# Patient Record
Sex: MALE | Race: OTHER | NOT HISPANIC OR LATINO
[De-identification: names, ages, dates, MRNs, and addresses within clinical notes are randomized per-mention and may not be internally consistent; named-entity substitution may affect disease eponyms.]

---
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
## 2022-02-01 ENCOUNTER — NON-APPOINTMENT (OUTPATIENT)
Age: 22
End: 2022-02-01

## 2022-02-01 ENCOUNTER — EMERGENCY (EMERGENCY)
Facility: HOSPITAL | Age: 22
LOS: 1 days | Discharge: HOME | End: 2022-02-01
Attending: EMERGENCY MEDICINE
Payer: COMMERCIAL

## 2022-02-01 VITALS
SYSTOLIC BLOOD PRESSURE: 137 MMHG | HEIGHT: 70 IN | WEIGHT: 195.11 LBS | TEMPERATURE: 99 F | RESPIRATION RATE: 20 BRPM | HEART RATE: 105 BPM | OXYGEN SATURATION: 99 % | DIASTOLIC BLOOD PRESSURE: 85 MMHG

## 2022-02-01 VITALS
DIASTOLIC BLOOD PRESSURE: 93 MMHG | TEMPERATURE: 99 F | HEART RATE: 92 BPM | SYSTOLIC BLOOD PRESSURE: 131 MMHG | RESPIRATION RATE: 18 BRPM | OXYGEN SATURATION: 97 %

## 2022-02-01 LAB
ALBUMIN SERPL ELPH-MCNC: 5.2 G/DL — HIGH (ref 3.3–5)
ALP SERPL-CCNC: 119 U/L — SIGNIFICANT CHANGE UP (ref 40–120)
ALT FLD-CCNC: 90 U/L — HIGH (ref 10–45)
ANION GAP SERPL CALC-SCNC: 15 MMOL/L — SIGNIFICANT CHANGE UP (ref 5–17)
APPEARANCE UR: CLEAR — SIGNIFICANT CHANGE UP
AST SERPL-CCNC: 49 U/L — HIGH (ref 10–40)
BACTERIA # UR AUTO: NEGATIVE — SIGNIFICANT CHANGE UP
BASOPHILS # BLD AUTO: 0.04 K/UL — SIGNIFICANT CHANGE UP (ref 0–0.2)
BASOPHILS NFR BLD AUTO: 0.4 % — SIGNIFICANT CHANGE UP (ref 0–2)
BILIRUB SERPL-MCNC: 0.7 MG/DL — SIGNIFICANT CHANGE UP (ref 0.2–1.2)
BILIRUB UR-MCNC: NEGATIVE — SIGNIFICANT CHANGE UP
BUN SERPL-MCNC: 14 MG/DL — SIGNIFICANT CHANGE UP (ref 7–23)
CALCIUM SERPL-MCNC: 10.1 MG/DL — SIGNIFICANT CHANGE UP (ref 8.4–10.5)
CHLORIDE SERPL-SCNC: 101 MMOL/L — SIGNIFICANT CHANGE UP (ref 96–108)
CK SERPL-CCNC: 120 U/L — SIGNIFICANT CHANGE UP (ref 30–200)
CO2 SERPL-SCNC: 23 MMOL/L — SIGNIFICANT CHANGE UP (ref 22–31)
COLOR SPEC: YELLOW — SIGNIFICANT CHANGE UP
CREAT SERPL-MCNC: 1.08 MG/DL — SIGNIFICANT CHANGE UP (ref 0.5–1.3)
DIFF PNL FLD: NEGATIVE — SIGNIFICANT CHANGE UP
EOSINOPHIL # BLD AUTO: 0.12 K/UL — SIGNIFICANT CHANGE UP (ref 0–0.5)
EOSINOPHIL NFR BLD AUTO: 1.1 % — SIGNIFICANT CHANGE UP (ref 0–6)
EPI CELLS # UR: 0 /HPF — SIGNIFICANT CHANGE UP
GLUCOSE SERPL-MCNC: 96 MG/DL — SIGNIFICANT CHANGE UP (ref 70–99)
GLUCOSE UR QL: NEGATIVE — SIGNIFICANT CHANGE UP
HCT VFR BLD CALC: 51.1 % — HIGH (ref 39–50)
HGB BLD-MCNC: 17.4 G/DL — HIGH (ref 13–17)
IMM GRANULOCYTES NFR BLD AUTO: 0.2 % — SIGNIFICANT CHANGE UP (ref 0–1.5)
KETONES UR-MCNC: NEGATIVE — SIGNIFICANT CHANGE UP
LEUKOCYTE ESTERASE UR-ACNC: NEGATIVE — SIGNIFICANT CHANGE UP
LYMPHOCYTES # BLD AUTO: 3.41 K/UL — HIGH (ref 1–3.3)
LYMPHOCYTES # BLD AUTO: 31.8 % — SIGNIFICANT CHANGE UP (ref 13–44)
MCHC RBC-ENTMCNC: 28 PG — SIGNIFICANT CHANGE UP (ref 27–34)
MCHC RBC-ENTMCNC: 34.1 GM/DL — SIGNIFICANT CHANGE UP (ref 32–36)
MCV RBC AUTO: 82.2 FL — SIGNIFICANT CHANGE UP (ref 80–100)
MONOCYTES # BLD AUTO: 0.68 K/UL — SIGNIFICANT CHANGE UP (ref 0–0.9)
MONOCYTES NFR BLD AUTO: 6.3 % — SIGNIFICANT CHANGE UP (ref 2–14)
NEUTROPHILS # BLD AUTO: 6.47 K/UL — SIGNIFICANT CHANGE UP (ref 1.8–7.4)
NEUTROPHILS NFR BLD AUTO: 60.2 % — SIGNIFICANT CHANGE UP (ref 43–77)
NITRITE UR-MCNC: NEGATIVE — SIGNIFICANT CHANGE UP
NRBC # BLD: 0 /100 WBCS — SIGNIFICANT CHANGE UP (ref 0–0)
PH UR: 6 — SIGNIFICANT CHANGE UP (ref 5–8)
PLATELET # BLD AUTO: 264 K/UL — SIGNIFICANT CHANGE UP (ref 150–400)
POTASSIUM SERPL-MCNC: 3.9 MMOL/L — SIGNIFICANT CHANGE UP (ref 3.5–5.3)
POTASSIUM SERPL-SCNC: 3.9 MMOL/L — SIGNIFICANT CHANGE UP (ref 3.5–5.3)
PROT SERPL-MCNC: 7.7 G/DL — SIGNIFICANT CHANGE UP (ref 6–8.3)
PROT UR-MCNC: ABNORMAL
RBC # BLD: 6.22 M/UL — HIGH (ref 4.2–5.8)
RBC # FLD: 12.4 % — SIGNIFICANT CHANGE UP (ref 10.3–14.5)
RBC CASTS # UR COMP ASSIST: 0 /HPF — SIGNIFICANT CHANGE UP (ref 0–4)
SODIUM SERPL-SCNC: 139 MMOL/L — SIGNIFICANT CHANGE UP (ref 135–145)
SP GR SPEC: 1.03 — HIGH (ref 1.01–1.02)
UROBILINOGEN FLD QL: NEGATIVE — SIGNIFICANT CHANGE UP
WBC # BLD: 10.74 K/UL — HIGH (ref 3.8–10.5)
WBC # FLD AUTO: 10.74 K/UL — HIGH (ref 3.8–10.5)
WBC UR QL: 1 /HPF — SIGNIFICANT CHANGE UP (ref 0–5)

## 2022-02-01 PROCEDURE — 85025 COMPLETE CBC W/AUTO DIFF WBC: CPT

## 2022-02-01 PROCEDURE — 81001 URINALYSIS AUTO W/SCOPE: CPT

## 2022-02-01 PROCEDURE — 93975 VASCULAR STUDY: CPT

## 2022-02-01 PROCEDURE — 76870 US EXAM SCROTUM: CPT | Mod: 26

## 2022-02-01 PROCEDURE — 87086 URINE CULTURE/COLONY COUNT: CPT

## 2022-02-01 PROCEDURE — 87591 N.GONORRHOEAE DNA AMP PROB: CPT

## 2022-02-01 PROCEDURE — 82550 ASSAY OF CK (CPK): CPT

## 2022-02-01 PROCEDURE — 93975 VASCULAR STUDY: CPT | Mod: 26

## 2022-02-01 PROCEDURE — 99285 EMERGENCY DEPT VISIT HI MDM: CPT

## 2022-02-01 PROCEDURE — 87491 CHLMYD TRACH DNA AMP PROBE: CPT

## 2022-02-01 PROCEDURE — 76870 US EXAM SCROTUM: CPT

## 2022-02-01 PROCEDURE — 80053 COMPREHEN METABOLIC PANEL: CPT

## 2022-02-01 PROCEDURE — 99284 EMERGENCY DEPT VISIT MOD MDM: CPT | Mod: 25

## 2022-02-01 RX ORDER — SODIUM CHLORIDE 9 MG/ML
1000 INJECTION INTRAMUSCULAR; INTRAVENOUS; SUBCUTANEOUS ONCE
Refills: 0 | Status: COMPLETED | OUTPATIENT
Start: 2022-02-01 | End: 2022-02-01

## 2022-02-01 RX ADMIN — SODIUM CHLORIDE 1000 MILLILITER(S): 9 INJECTION INTRAMUSCULAR; INTRAVENOUS; SUBCUTANEOUS at 19:00

## 2022-02-01 NOTE — ED PROVIDER NOTE - DISCHARGE DATE
ED Provider Note    CHIEF COMPLAINT  Chief Complaint   Patient presents with   • Testicle Pain     right greater than left.  Pt states this has been going on for 1 year, worse in last 3 mos.  Pt has blood in his semen   • Abdominal Pain     lower abd     HPI  Patient is a 24-year-old male who presents emergency room for evaluation of lower abdominal discomfort and testicular tenderness.  He reports that this is been going on in some fashion for the better part of the year.  He reports that the tenderness is occasional but oftentimes will be daily.  He goes several months of having daily pains that are so debilitating that he is unable to stand.  He associates this with testicular swelling and groin discomfort that is worse on the left than the right.  Over the last several weeks he has been having tenderness in the testicles bilaterally with associated occasional drainage of the penis.  He reports this as what he attributes to be blood in his ejaculate and denies any penile lesions.  He is currently sexually active and denies history of prior STDs.  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  He does note that it is more frequent to get episodes at work but he denies having pressure or swelling in the inguinal canal with these episodes.  No familiar history of hernias or other acute medical illnesses.  He denies fevers, chills, recent illness and has not taken any medicines for this acute complaint.    REVIEW OF SYSTEMS  Constitutional: No fevers, chills, or recent illness.  Skin: No rashes or diaphoresis.  Eyes: No change in vision, no discharge.  ENT: No hearing change. No rhinorrhea or nasal congestion, no ST or difficulty swallowing.  Respiratory: No SOB. No coughing or hemoptysis. No Wheezing.  Cardiac: No CP, palpitations, edema. No PND or orthopnea.  GI: No Abdominal Pain; N/V; diarrhea, constipation. No blood in stool.  : As above.  No dysuria. No D/C. No frequency or  "urgency  MSK: No pain in joints or muscles. No calf pain or swelling.  Neuro: No HA or paresthesias. No focal weakness.  Psych: No SI, HI, AH, VH.  Endocrine: No polyuria or polydipsia. No heat or cold intolerance.  Heme: No easy bruising. No history of bleeding disorders or anemia.    PAST MEDICAL HISTORY       SOCIAL HISTORY  Social History     Social History Main Topics   • Smoking status: Current Every Day Smoker     Packs/day: 0.25     Types: Cigarettes   • Smokeless tobacco: Never Used   • Alcohol use Yes   • Drug use: Yes      Comment: Marijuana   • Sexual activity: Not on file       SURGICAL HISTORY  patient denies any surgical history    CURRENT MEDICATIONS  Home Medications    **Home medications have not yet been reviewed for this encounter**         ALLERGIES  Allergies   Allergen Reactions   • Zinc Swelling       PHYSICAL EXAM  VITAL SIGNS: /82   Pulse 95   Temp 36.6 °C (97.9 °F) (Temporal)   Resp 19   Ht 1.702 m (5' 7\")   Wt 104.7 kg (230 lb 13.2 oz)   SpO2 96%   BMI 36.15 kg/m²    Pulse ox interpretation: I interpret this pulse ox as normal.  Genl: M sitting in chair comfortably, speaking clearly, appears in mild distress   Head: NC/AT   ENT: Mucous membranes moist, posterior pharynx clear, uvula midline, nares patent bilaterally   Eyes: Normal sclera, pupils equal round reactive to light  Neck: Supple, FROM, no LAD appreciated   Pulmonary: Lungs are clear to auscultation bilaterally  Chest: No TTP  CV:  RRR, no murmur appreciated, pulses 2+ in both upper and lower extremities,  Abdomen: soft, NT/ND; no rebound/guarding, no masses palpated, no HSM   : no CVA tenderness.  Normal external male genitalia.  He has tenderness without swelling on the inguinal area he has bilateral testicular tenderness to palpation, no appreciable swelling, no abnormal lie, normal reflexes.  Inguinal ring is unable to be palpated however he has tenderness along the spermatic cord and vascular bundle on the " left side.  Musculoskeletal: Pain free ROM of the neck. Moving upper and lower extremities and spontaneous in coordinated fashion  Neuro: A&Ox4 (person, place, time, situation), speech fluent, gait steady, no focal deficits appreciated, No cerebellar signs. Sensation is grossly intact in the distal upper and lower extremities. 5/5 strength in  and dorsiflexion/plantar flexion of the ankles  Psych: Patient has an appropriate affect and behavior  Skin: No rash or lesions.  No pallor or jaundice.  No cyanosis.  Warm and dry.     COURSE & MEDICAL DECISION MAKING  Pertinent Labs & Imaging studies reviewed. (See chart for details)    DDX:  STD exposure, urethritis, orchitis, testicular cysts, torsion considered but less likely, inguinal hernia, epididymitis    MDM    Initial evaluation at 1245:  Patient presents emergency room for the symptoms as described above.  Overall his pain has been intermittent in nature and prolonged in duration.  He presents as things have gotten acutely worse and he was pressured by family members though he has no primary care physician for management of these prolonged problems.  He has no other recent illness and complains primarily of testicular and groin tenderness.  He does not have any lymphadenopathy however he does have tenderness along the left inguinal canal without obvious swelling or signs of acute incarceration.  The testicular lie appears normal with intact reflexes and based on the overall symptomology I do not think this is consistent with testicular torsion.  He is sexually active, does have some underlying concerns for the possibility of an STD and is treated empirically for this in addition to collecting urinalysis and sending GC chlamydia testing.  Ultrasound of the testicles and scrotal contents is obtained and demonstrates no acute signs of trapped abdominal viscera, no swelling of the testicles or abnormal lesions and no evidence of torsion.    Clinically there does  not appear to be any entrapped hernia but his intermittent pains and the fact that he works heavy labor with lifting which appears to be exacerbating his tenderness makes his concern for an intermittent hernia.  This is not visualized on ultrasound at this time feel the patient has adequate pain control and will follow up with a surgeon in a nonemergent manner.  He is updated regarding the results of his laboratory results and urinalysis in addition to ultrasound testing.  He will return to the emergency department for scrotal mass or swelling, discoloration of the skin, intractable nausea vomiting, bloody diarrhea or if he is not acting like himself.  He verbalized understanding of the signs of and is discharged home with anti-inflammatory medications and surgical follow-up.    FINAL IMPRESSION  Visit Diagnoses     ICD-10-CM   1. Lower abdominal pain R10.30   2. Testicle pain N50.819       Electronically signed by: Arias Devries, 7/20/2019 12:44 AM            01-Feb-2022

## 2022-02-01 NOTE — ED PROVIDER NOTE - CLINICAL SUMMARY MEDICAL DECISION MAKING FREE TEXT BOX
21y M w/ PMHx of GERD presents to the ED c/o intermittent testicular pain acutely worsening 3 days ago. vitals wnl. no fevers. no active testicular pain. PE as noted above. ddx concerns for epididymitis vs orchitis vs torsion. will order labs, imaging, ekg, meds, reassess

## 2022-02-01 NOTE — ED PROVIDER NOTE - OBJECTIVE STATEMENT
21y M w/ PMHx of GERD presents to the ED c/o intermittent testicular pain and pelvic pain acutely worsening 3 days ago. Reports 2 episodes so far. States that pain is improving since last incident. Denies recent trauma. Denies current pain. Denies smoking, etoh use, street drug use. Denies abd pain, N/V. NKDA. 21y M w/ PMHx of GERD presents to the ED c/o intermittent testicular pain acutely worsening 3 days ago. Reports 2 episodes so far. States that pain is improving since last incident. Pain is worse with positional movements. Denies recent trauma. Denies current pain. Denies smoking, etoh use, street drug use. Denies abd pain, N/V. NKDA. She denies any other symptoms at bedside.

## 2022-02-01 NOTE — ED PROVIDER NOTE - ATTENDING CONTRIBUTION TO CARE
attending Norma: 21yM no PMH p/w 5 days b/l testicular and b/l groin pain that radiates down b/l thighs. Intermittent, mild. Denies associated urinary symptoms, penile discharge, rash. Reports pain somewhat worse after working out, denies strenuous exercise. No prior abdominal surgeries. Hasn't taken anything for pain and declines pain meds in ED. Denies h/o kidney stones, sexually transmitted infections. On exam, well appearing in no acute distress, no CVAT, abdomen soft/NT,  exam: normal external genitalia, circumcised penis, no rashes, testicles with normal lie bilaterally, +cremasteric reflex bilaterally, testicles nontender bilaterally without palpable masses. Will obtain labs including CK, UA, urine GC/chlamydia, US testicles, reassess

## 2022-02-01 NOTE — ED PROVIDER NOTE - NSFOLLOWUPINSTRUCTIONS_ED_ALL_ED_FT
Stay well hydrated.  Follow-up with your primary doctor within 1-2 days  Bring a copy of your results with you  You will receive a call if any other results that have not come back yet are abnormal.  Return to an ER for worsening symptoms or any other concerns.

## 2022-02-01 NOTE — ED PROVIDER NOTE - PHYSICAL EXAMINATION
GENERAL: no acute distress, non-toxic appearing  HEAD: normocephalic, atraumatic  HEENT: normal conjunctiva, oral mucosa moist, neck supple  CARDIAC: regular rate and rhythm, normal S1 and S2,  no appreciable murmurs  PULM: clear to ascultation bilaterally, no crackles, rales, rhonchi, or wheezing  GI: abdomen nondistended, soft, nontender, no guarding or rebound tenderness  : no CVA tenderness, no suprapubic tenderness  Gential exam: chaperoned by colton  NEURO: alert and oriented x 3, normal speech, PERRLA, EOMI, no focal motor or sensory deficits, nonantalgic gait  MSK: no visible deformities, no peripheral edema, calf tenderness/redness/swelling  SKIN: no visible rashes, dry, well-perfused  PSYCH: appropriate mood and affect GENERAL: no acute distress, non-toxic appearing  HEAD: normocephalic, atraumatic  HEENT: normal conjunctiva, oral mucosa moist, neck supple  CARDIAC: regular rate and rhythm, normal S1 and S2,  no appreciable murmurs  PULM: clear to ascultation bilaterally, no crackles, rales, rhonchi, or wheezing  GI: abdomen nondistended, soft, nontender, no guarding or rebound tenderness  : no CVA tenderness, no suprapubic tenderness    Gential exam: chaperoned by Josefa PAS: grossly unremarkable genitalia, no lesions noted, no discharge noted, no hernia noted, cremasteric reflex intact    NEURO: alert and oriented x 3, normal speech, PERRLA, EOMI, no focal motor or sensory deficits, nonantalgic gait  MSK: no visible deformities, no peripheral edema, calf tenderness/redness/swelling  SKIN: no visible rashes, dry, well-perfused  PSYCH: appropriate mood and affect

## 2022-02-01 NOTE — ED ADULT NURSE NOTE - OBJECTIVE STATEMENT
21y male arrived to ED complaining of testicular pain x5 days. Patient reports that pain is more of a discomfort in the testicles and bilateral sides of the groin and inner thighs. Discomfort has been improving and pt has a similar episode to this in October. Patient is sexually active, using protection w/ one partner. Denies CP, SOB, n/v/d, abd pain, chills, fever, urinary changes, penile discharge or blood, swelling of the testicles. Patient states that pain is worse with movement (ie working out). A&Ox4, ambulatory, VS stable, no significant pMHx.

## 2022-02-02 ENCOUNTER — APPOINTMENT (OUTPATIENT)
Dept: UROLOGY | Facility: CLINIC | Age: 22
End: 2022-02-02

## 2022-02-02 LAB
C TRACH RRNA SPEC QL NAA+PROBE: SIGNIFICANT CHANGE UP
CULTURE RESULTS: SIGNIFICANT CHANGE UP
N GONORRHOEA RRNA SPEC QL NAA+PROBE: SIGNIFICANT CHANGE UP
SPECIMEN SOURCE: SIGNIFICANT CHANGE UP

## 2022-02-25 PROBLEM — K21.9 GASTRO-ESOPHAGEAL REFLUX DISEASE WITHOUT ESOPHAGITIS: Chronic | Status: ACTIVE | Noted: 2022-02-01

## 2022-03-02 ENCOUNTER — APPOINTMENT (OUTPATIENT)
Dept: UROLOGY | Facility: CLINIC | Age: 22
End: 2022-03-02
Payer: COMMERCIAL

## 2022-03-02 VITALS
DIASTOLIC BLOOD PRESSURE: 95 MMHG | BODY MASS INDEX: 28.88 KG/M2 | SYSTOLIC BLOOD PRESSURE: 137 MMHG | HEIGHT: 69 IN | WEIGHT: 195 LBS | HEART RATE: 83 BPM

## 2022-03-02 DIAGNOSIS — N48.89 OTHER SPECIFIED DISORDERS OF PENIS: ICD-10-CM

## 2022-03-02 PROCEDURE — 99204 OFFICE O/P NEW MOD 45 MIN: CPT

## 2022-03-02 NOTE — ASSESSMENT
[FreeTextEntry1] : The patient has no evidence of acute  pathology.  I encouraged him to increase his fluid intake, which is quite poor, and to void and ejaculate regularly.  He will use ibuprofen as needed.  Did encourage him to call us or come in if acute symptoms develop.

## 2022-03-02 NOTE — REVIEW OF SYSTEMS
[Wake up at night to urinate  How many times?  ___] : wakes up to urinate [unfilled] times during the night [Negative] : Heme/Lymph [Testicular Pain] : testicular pain [Urine Infection (bladder/kidney)] : denies bladder/kidney infections [Pain during urination] : denies pain during urination [Pain at onset of urination] : denies pain during onset of urination [Pain after urination] : denies pain after urination [Blood in urine that you can see] : denies seeing blood in urine [Told you have blood in urine on a urine test] : denies being told that blood was present in a urine test [Discharge from urine canal] : denies discharge from urine canal [History of kidney stones] : denies history of kidney stones [Urine retention] : denies urine retention [Strong urge to urinate] : denies strong urge to urinate [Bladder pressure] : denies bladder pressure [Strain or push to urinate] : denies straining or pushing to urinate [Wait a long time to urinate] : denies waiting a long time to urinate [Slow urine stream] : denies slow urine stream [Interrupted urine stream] : denies interrupted urine stream [Bladder fullness after urinating] : denies bladder fullness after urinating [Increased pain/discomfort with bladder filling] : denies increased pain/discomfort with bladder filling [Leakage of urine with straining, coughing, laughing] : denies leakage of urine with straining, coughing, and/or laughing [Unaware of when urine is leaking] : aware of when urine is leaking

## 2022-03-02 NOTE — HISTORY OF PRESENT ILLNESS
[FreeTextEntry1] : This is a 21-year-old  male who noted intermittent scrotal and groin discomfort starting in October 2021.  In addition, he feels some penile discomfort.  He denies dysuria, hematuria, suprapubic or flank pain.  He is sexually active and has no pain with ejaculation.  The patient was recently seen in emergency room because of his symptoms and had an unremarkable scrotal ultrasound.

## 2022-03-02 NOTE — PHYSICAL EXAM
[General Appearance - Well Developed] : well developed [General Appearance - Well Nourished] : well nourished [Normal Appearance] : normal appearance [Well Groomed] : well groomed [General Appearance - In No Acute Distress] : no acute distress [Abdomen Soft] : soft [Abdomen Tenderness] : non-tender [Costovertebral Angle Tenderness] : no ~M costovertebral angle tenderness [Urethral Meatus] : meatus normal [Penis Abnormality] : normal circumcised penis [Urinary Bladder Findings] : the bladder was normal on palpation [Scrotum] : the scrotum was normal [Epididymis] : the epididymides were normal [Testes Tenderness] : no tenderness of the testes [Testes Mass (___cm)] : there were no testicular masses [Prostate Tenderness] : the prostate was not tender [No Prostate Nodules] : no prostate nodules [Prostate Size ___ (0-4)] : prostate size [unfilled] (scale: 0-4) [Edema] : no peripheral edema [] : no respiratory distress [Respiration, Rhythm And Depth] : normal respiratory rhythm and effort [Exaggerated Use Of Accessory Muscles For Inspiration] : no accessory muscle use [Oriented To Time, Place, And Person] : oriented to person, place, and time [Affect] : the affect was normal [Mood] : the mood was normal [Not Anxious] : not anxious [Normal Station and Gait] : the gait and station were normal for the patient's age [No Focal Deficits] : no focal deficits [No Palpable Adenopathy] : no palpable adenopathy

## 2022-03-03 LAB
APPEARANCE: CLEAR
BACTERIA: NEGATIVE
BILIRUBIN URINE: NEGATIVE
BLOOD URINE: NEGATIVE
COLOR: NORMAL
GLUCOSE QUALITATIVE U: NEGATIVE
HYALINE CASTS: 0 /LPF
KETONES URINE: NEGATIVE
LEUKOCYTE ESTERASE URINE: NEGATIVE
MICROSCOPIC-UA: NORMAL
NITRITE URINE: NEGATIVE
PH URINE: 6
PROTEIN URINE: NEGATIVE
RED BLOOD CELLS URINE: 1 /HPF
SPECIFIC GRAVITY URINE: 1.02
SQUAMOUS EPITHELIAL CELLS: 0 /HPF
UROBILINOGEN URINE: NORMAL
WHITE BLOOD CELLS URINE: 1 /HPF

## 2022-03-04 LAB — BACTERIA UR CULT: NORMAL

## 2022-06-01 ENCOUNTER — APPOINTMENT (OUTPATIENT)
Dept: UROLOGY | Facility: CLINIC | Age: 22
End: 2022-06-01
Payer: COMMERCIAL

## 2022-06-01 VITALS
HEIGHT: 69 IN | BODY MASS INDEX: 31.1 KG/M2 | SYSTOLIC BLOOD PRESSURE: 135 MMHG | WEIGHT: 210 LBS | DIASTOLIC BLOOD PRESSURE: 87 MMHG | TEMPERATURE: 97.8 F | HEART RATE: 74 BPM

## 2022-06-01 DIAGNOSIS — I86.1 SCROTAL VARICES: ICD-10-CM

## 2022-06-01 DIAGNOSIS — N41.1 CHRONIC PROSTATITIS: ICD-10-CM

## 2022-06-01 DIAGNOSIS — N50.82 SCROTAL PAIN: ICD-10-CM

## 2022-06-01 PROCEDURE — 76870 US EXAM SCROTUM: CPT

## 2022-06-01 PROCEDURE — 99214 OFFICE O/P EST MOD 30 MIN: CPT

## 2022-06-02 PROBLEM — I86.1 VARICOCELE: Status: ACTIVE | Noted: 2022-06-02

## 2022-06-02 PROBLEM — N41.1 CHRONIC PROSTATITIS: Status: ACTIVE | Noted: 2022-06-02

## 2022-06-02 PROBLEM — N50.82 SCROTAL PAIN: Status: ACTIVE | Noted: 2022-03-02

## 2022-06-02 LAB
APPEARANCE: CLEAR
BACTERIA: NEGATIVE
BILIRUBIN URINE: NEGATIVE
BLOOD URINE: NEGATIVE
COLOR: NORMAL
GLUCOSE QUALITATIVE U: NEGATIVE
HYALINE CASTS: 0 /LPF
KETONES URINE: NEGATIVE
LEUKOCYTE ESTERASE URINE: NEGATIVE
MICROSCOPIC-UA: NORMAL
NITRITE URINE: NEGATIVE
PH URINE: 6
PROTEIN URINE: NEGATIVE
RED BLOOD CELLS URINE: 0 /HPF
SPECIFIC GRAVITY URINE: 1.02
SQUAMOUS EPITHELIAL CELLS: 0 /HPF
UROBILINOGEN URINE: NORMAL
WHITE BLOOD CELLS URINE: 0 /HPF

## 2022-06-02 NOTE — REVIEW OF SYSTEMS
[Negative] : Heme/Lymph [Testicular Pain] : testicular pain [Wake up at night to urinate  How many times?  ___] : wakes up to urinate [unfilled] times during the night [Urine Infection (bladder/kidney)] : denies bladder/kidney infections [Pain during urination] : denies pain during urination [Pain at onset of urination] : denies pain during onset of urination [Pain after urination] : denies pain after urination [Blood in urine that you can see] : denies seeing blood in urine [Told you have blood in urine on a urine test] : denies being told that blood was present in a urine test [Discharge from urine canal] : denies discharge from urine canal [History of kidney stones] : denies history of kidney stones [Urine retention] : denies urine retention [Strong urge to urinate] : denies strong urge to urinate [Bladder pressure] : denies bladder pressure [Strain or push to urinate] : denies straining or pushing to urinate [Wait a long time to urinate] : denies waiting a long time to urinate [Slow urine stream] : denies slow urine stream [Interrupted urine stream] : denies interrupted urine stream [Bladder fullness after urinating] : denies bladder fullness after urinating [Increased pain/discomfort with bladder filling] : denies increased pain/discomfort with bladder filling [Bladder problems as child. If yes, describe..] : denies bladder problems as child [Leakage of urine with straining, coughing, laughing] : denies leakage of urine with straining, coughing, and/or laughing [Unaware of when urine is leaking] : aware of when urine is leaking

## 2022-06-02 NOTE — LETTER BODY
[Dear  ___] : Dear  [unfilled], [Courtesy Letter:] : I had the pleasure of seeing your patient, [unfilled], in my office today. [Please see my note below.] : Please see my note below. [Consult Closing:] : Thank you very much for allowing me to participate in the care of this patient.  If you have any questions, please do not hesitate to contact me. [Sincerely,] : Sincerely, [FreeTextEntry3] : Merrill Najera MD

## 2022-06-02 NOTE — PHYSICAL EXAM
[General Appearance - Well Developed] : well developed [General Appearance - Well Nourished] : well nourished [Normal Appearance] : normal appearance [Well Groomed] : well groomed [General Appearance - In No Acute Distress] : no acute distress [Abdomen Soft] : soft [Abdomen Tenderness] : non-tender [Costovertebral Angle Tenderness] : no ~M costovertebral angle tenderness [Urethral Meatus] : meatus normal [Penis Abnormality] : normal circumcised penis [Urinary Bladder Findings] : the bladder was normal on palpation [Scrotum] : the scrotum was normal [Epididymis] : the epididymides were normal [Testes Tenderness] : no tenderness of the testes [Testes Mass (___cm)] : there were no testicular masses [Prostate Tenderness] : the prostate was not tender [No Prostate Nodules] : no prostate nodules [Prostate Size ___ (0-4)] : prostate size [unfilled] (scale: 0-4) [Edema] : no peripheral edema [] : no respiratory distress [Respiration, Rhythm And Depth] : normal respiratory rhythm and effort [Exaggerated Use Of Accessory Muscles For Inspiration] : no accessory muscle use [Oriented To Time, Place, And Person] : oriented to person, place, and time [Affect] : the affect was normal [Mood] : the mood was normal [Not Anxious] : not anxious [Normal Station and Gait] : the gait and station were normal for the patient's age [No Focal Deficits] : no focal deficits [No Palpable Adenopathy] : no palpable adenopathy [FreeTextEntry1] : prostate slightly boggy

## 2022-06-02 NOTE — ASSESSMENT
[FreeTextEntry1] : The patient has no evidence of acute process.  Scrotal ultrasound today did reveal small bilateral varicocele and a small left epididymal cyst.  Physical examination is consistent with likely mild chronic prostatitis.  The patient was reassured and again advised to maintain good fluid intake and to void and ejaculate regularly.  In view of the varicocele, he will collect semen for analysis and if that is unremarkable, we will see him down the road as needed.  I discussed his condition at length with the patient and his mother.

## 2022-06-02 NOTE — HISTORY OF PRESENT ILLNESS
[FreeTextEntry1] : Patient is reporting intermittent bilateral cyst scrotal discomfort at times radiating to the lower extremities.  This is not severe enough to take analgesics.  He denies dysuria, hematuria, suprapubic or flank pain.  Urinary studies in March were unremarkable.

## 2022-06-03 LAB — BACTERIA UR CULT: NORMAL

## 2023-03-16 NOTE — ED BEHAVIORAL HEALTH ASSESSMENT NOTE - REMOTE MEMORY
Unable to get a hold of patient left a voicemail regarding the following:    Normal hemoglobin, no anemia  Elevated platelets, repeat test in 2 weeks      Orders placed informed patient does not need appointment.    If any further questions to call us back at [phone]    
Normal

## 2023-09-27 ENCOUNTER — APPOINTMENT (OUTPATIENT)
Dept: NEUROLOGY | Facility: CLINIC | Age: 23
End: 2023-09-27

## 2024-05-30 ENCOUNTER — APPOINTMENT (OUTPATIENT)
Dept: ENDOCRINOLOGY | Facility: CLINIC | Age: 24
End: 2024-05-30

## 2025-04-21 ENCOUNTER — APPOINTMENT (OUTPATIENT)
Dept: UROLOGY | Facility: CLINIC | Age: 25
End: 2025-04-21
Payer: COMMERCIAL

## 2025-04-21 ENCOUNTER — NON-APPOINTMENT (OUTPATIENT)
Age: 25
End: 2025-04-21

## 2025-04-21 VITALS
DIASTOLIC BLOOD PRESSURE: 88 MMHG | OXYGEN SATURATION: 98 % | HEART RATE: 96 BPM | RESPIRATION RATE: 16 BRPM | SYSTOLIC BLOOD PRESSURE: 137 MMHG

## 2025-04-21 DIAGNOSIS — N50.3 CYST OF EPIDIDYMIS: ICD-10-CM

## 2025-04-21 DIAGNOSIS — I86.1 SCROTAL VARICES: ICD-10-CM

## 2025-04-21 DIAGNOSIS — N50.819 TESTICULAR PAIN, UNSPECIFIED: ICD-10-CM

## 2025-04-21 PROCEDURE — 99214 OFFICE O/P EST MOD 30 MIN: CPT

## 2025-04-21 PROCEDURE — 76870 US EXAM SCROTUM: CPT

## 2025-04-21 RX ORDER — LEVOFLOXACIN 500 MG/1
500 TABLET, FILM COATED ORAL DAILY
Qty: 5 | Refills: 1 | Status: ACTIVE | COMMUNITY
Start: 2025-04-21 | End: 1900-01-01

## 2025-04-22 LAB
APPEARANCE: ABNORMAL
BACTERIA: NEGATIVE /HPF
BILIRUBIN URINE: NEGATIVE
BLOOD URINE: NEGATIVE
CAST: 0 /LPF
COLOR: YELLOW
EPITHELIAL CELLS: 0 /HPF
GLUCOSE QUALITATIVE U: NEGATIVE MG/DL
KETONES URINE: ABNORMAL MG/DL
LEUKOCYTE ESTERASE URINE: NEGATIVE
MICROSCOPIC-UA: NORMAL
NITRITE URINE: NEGATIVE
PH URINE: 5.5
PROTEIN URINE: NEGATIVE MG/DL
RED BLOOD CELLS URINE: 1 /HPF
SPECIFIC GRAVITY URINE: 1.02
UROBILINOGEN URINE: 0.2 MG/DL
WHITE BLOOD CELLS URINE: 1 /HPF

## 2025-04-23 ENCOUNTER — NON-APPOINTMENT (OUTPATIENT)
Age: 25
End: 2025-04-23

## 2025-04-23 LAB — BACTERIA UR CULT: NORMAL

## 2025-05-21 ENCOUNTER — APPOINTMENT (OUTPATIENT)
Dept: UROLOGY | Facility: CLINIC | Age: 25
End: 2025-05-21
Payer: COMMERCIAL

## 2025-05-21 VITALS
RESPIRATION RATE: 16 BRPM | SYSTOLIC BLOOD PRESSURE: 126 MMHG | OXYGEN SATURATION: 98 % | DIASTOLIC BLOOD PRESSURE: 78 MMHG | HEART RATE: 77 BPM

## 2025-05-21 DIAGNOSIS — N41.1 CHRONIC PROSTATITIS: ICD-10-CM

## 2025-05-21 DIAGNOSIS — I86.1 SCROTAL VARICES: ICD-10-CM

## 2025-05-21 DIAGNOSIS — N50.3 CYST OF EPIDIDYMIS: ICD-10-CM

## 2025-05-21 DIAGNOSIS — N50.82 SCROTAL PAIN: ICD-10-CM

## 2025-05-21 DIAGNOSIS — N50.819 TESTICULAR PAIN, UNSPECIFIED: ICD-10-CM

## 2025-05-21 DIAGNOSIS — N48.89 OTHER SPECIFIED DISORDERS OF PENIS: ICD-10-CM

## 2025-05-21 PROCEDURE — 99213 OFFICE O/P EST LOW 20 MIN: CPT
